# Patient Record
Sex: MALE | Race: WHITE
[De-identification: names, ages, dates, MRNs, and addresses within clinical notes are randomized per-mention and may not be internally consistent; named-entity substitution may affect disease eponyms.]

---

## 2022-11-10 ENCOUNTER — APPOINTMENT (OUTPATIENT)
Dept: INTERNAL MEDICINE | Facility: CLINIC | Age: 22
End: 2022-11-10

## 2022-11-10 VITALS — WEIGHT: 197 LBS | BODY MASS INDEX: 29.95 KG/M2

## 2022-11-10 VITALS
BODY MASS INDEX: 17.73 KG/M2 | HEIGHT: 68 IN | SYSTOLIC BLOOD PRESSURE: 128 MMHG | TEMPERATURE: 98.8 F | OXYGEN SATURATION: 98 % | WEIGHT: 117 LBS | DIASTOLIC BLOOD PRESSURE: 68 MMHG | HEART RATE: 117 BPM

## 2022-11-10 DIAGNOSIS — F17.200 NICOTINE DEPENDENCE, UNSPECIFIED, UNCOMPLICATED: ICD-10-CM

## 2022-11-10 DIAGNOSIS — Z00.00 ENCOUNTER FOR GENERAL ADULT MEDICAL EXAMINATION W/OUT ABNORMAL FINDINGS: ICD-10-CM

## 2022-11-10 DIAGNOSIS — G43.909 MIGRAINE, UNSPECIFIED, NOT INTRACTABLE, W/OUT STATUS MIGRAINOSUS: ICD-10-CM

## 2022-11-10 DIAGNOSIS — Z80.8 FAMILY HISTORY OF MALIGNANT NEOPLASM OF OTHER ORGANS OR SYSTEMS: ICD-10-CM

## 2022-11-10 DIAGNOSIS — Z23 ENCOUNTER FOR IMMUNIZATION: ICD-10-CM

## 2022-11-10 DIAGNOSIS — Z78.9 OTHER SPECIFIED HEALTH STATUS: ICD-10-CM

## 2022-11-10 DIAGNOSIS — U07.1 COVID-19: ICD-10-CM

## 2022-11-10 DIAGNOSIS — F41.9 ANXIETY DISORDER, UNSPECIFIED: ICD-10-CM

## 2022-11-10 PROCEDURE — G0444 DEPRESSION SCREEN ANNUAL: CPT | Mod: 59

## 2022-11-10 PROCEDURE — 99385 PREV VISIT NEW AGE 18-39: CPT | Mod: 25

## 2022-11-10 NOTE — HISTORY OF PRESENT ILLNESS
[FreeTextEntry1] : Pt is here for a physical.\par Here for the first time [de-identified] : Has   self dx migraines with visual aura of flashing light  followed by  severe headache. They were very infreq until recently.    Has been able to take excedrin when they happened. Have been   lasting about one hour.   Ususally the visual sx    would resolve once the headache began.   The pattern  changed over  the past one week.   Had two in one week.  Pt  had a headache that occurred while he was exercising, got visual aura and headache and was lying  down in bed.   He was alone and tried to speak  and felt    his speech was sluured for maybe one minute. His parents      whom he  then spoke to  did not notice anything.     Might have had less water  then usual that day.

## 2022-11-10 NOTE — HEALTH RISK ASSESSMENT
[Current] : Current [No] : In the past 12 months have you used drugs other than those required for medical reasons? No [0] : 2) Feeling down, depressed, or hopeless: Not at all (0) [Yes] : Yes [1 or 2 (0 pts)] : 1 or 2 (0 points) [Less than monthly (1 pt)] : Less than monthly (1 point) [No falls in past year] : Patient reported no falls in the past year [PHQ-2 Negative - No further assessment needed] : PHQ-2 Negative - No further assessment needed [None] : None [With Family] : lives with family [Single] : single [Fully functional (bathing, dressing, toileting, transferring, walking, feeding)] : Fully functional (bathing, dressing, toileting, transferring, walking, feeding) [Fully functional (using the telephone, shopping, preparing meals, housekeeping, doing laundry, using] : Fully functional and needs no help or supervision to perform IADLs (using the telephone, shopping, preparing meals, housekeeping, doing laundry, using transportation, managing medications and managing finances) [Smoke Detector] : smoke detector [Carbon Monoxide Detector] : carbon monoxide detector [Seat Belt] :  uses seat belt [With Patient/Caregiver] : , with patient/caregiver [de-identified] : walking  [de-identified] : reg [MPI3Egqal] : 0 [EyeExamDate] : 2021 [Change in mental status noted] : No change in mental status noted [AdvancecareDate] : 11/10/22

## 2022-11-19 ENCOUNTER — APPOINTMENT (OUTPATIENT)
Dept: MRI IMAGING | Facility: CLINIC | Age: 22
End: 2022-11-19

## 2023-02-10 ENCOUNTER — NON-APPOINTMENT (OUTPATIENT)
Age: 23
End: 2023-02-10

## 2023-02-10 ENCOUNTER — APPOINTMENT (OUTPATIENT)
Dept: NEUROLOGY | Facility: CLINIC | Age: 23
End: 2023-02-10
Payer: COMMERCIAL

## 2023-02-10 VITALS
WEIGHT: 195 LBS | BODY MASS INDEX: 29.55 KG/M2 | DIASTOLIC BLOOD PRESSURE: 77 MMHG | SYSTOLIC BLOOD PRESSURE: 114 MMHG | HEART RATE: 103 BPM | TEMPERATURE: 97.8 F | HEIGHT: 68 IN

## 2023-02-10 DIAGNOSIS — G43.109 MIGRAINE WITH AURA, NOT INTRACTABLE, W/OUT STATUS MIGRAINOSUS: ICD-10-CM

## 2023-02-10 DIAGNOSIS — Z78.9 OTHER SPECIFIED HEALTH STATUS: ICD-10-CM

## 2023-02-10 PROCEDURE — 99204 OFFICE O/P NEW MOD 45 MIN: CPT

## 2023-02-10 NOTE — HISTORY OF PRESENT ILLNESS
[FreeTextEntry1] : Mr. Crain presents today for neurology evaluation.\par In November 2021 he began to have headaches that were occurring once every few months.\par \par He says that his first symptom is a visual disturbance as if he stared at the light too long. This typically starts in his right eye and moves across his visual field to the left eye. This lasts ~ 15-20 minutes and is followed by a headache and dizziness.\par The pain is usually holocephalic and described as pressure. \par He is not sure if he has photophobia or phonophobia but proactively tries to go to a quiet, dark room.\par He does not have nausea/vomiting.\par His last episode was in November 2022. He felt foggy and could not speak well and then became very anxious.\par \par Excedrin is helpful to abort the headache.\par He thinks that dehydration may be a trigger.\par He is not aware of any food triggers.\par He is not aware of any family history of migraines. \par \par He sleeps for 6-8 hours per night. \par \par He thinks that stress may play a role.\par

## 2023-02-10 NOTE — CONSULT LETTER
[Dear  ___] : Dear  [unfilled], [Consult Letter:] : I had the pleasure of evaluating your patient, [unfilled]. [Consult Closing:] : Thank you very much for allowing me to participate in the care of this patient.  If you have any questions, please do not hesitate to contact me. [FreeTextEntry2] : Edwige Pandey [FreeTextEntry3] : Sincerely,\par \par \par Anita Palmer MD\par Diplomate, [employer] Neu[employer]\par Board Certified in the Subspecialty of Clinical Neurophysiology\par Board Certified in the Subspecialty of Sleep Medicine\par Board Certified in the Subspecialty of Epilepsy\par

## 2023-02-10 NOTE — DISCUSSION/SUMMARY
[FreeTextEntry1] : Mr. Crain is 22 year old man with headaches preceded by visual disturbance.\par His presentation is consistent with migraine with aura.\par \par He has a non-focal neurological examination.\par \par \par We discussed preventative versus abortive treatment for migraines and at this time I do not think that her headache frequency justifies preventative therapy.\par \par We discussed options to abort migraines including triptans.\par He is declining a prescription for triptan at this time since he finds that he has benefit with Excedrin.\par I am giving him a sample of Nurtec 75 mg to be used in case of a migraine refractory to Excedrin.\par \par We discussed common migraine triggers. He should keep a headache diary in order to recognize any patterns/triggers.\par \par An MRI of the brain had been ordered by Dr. Pandey.  He was unable to complete the study due to claustrophobia.  I suggested a repeat MRI with medication such as diazepam.  He does not want to take this medication.  He is willing to try an open MRI.\par \par Follow-up in 6 to 9 months.  He was advised to call sooner if he has any worsening of his migraines or changes his mind about prescription medications.\par

## 2023-02-10 NOTE — PHYSICAL EXAM
[FreeTextEntry1] : Examination:\par Constitutional: normal, no apparent distress\par Eyes: normal conjunctiva b/l, no ptosis, visual fields full\par Respiratory: no respiratory distress, normal effort, normal auscultation\par Cardiovascular: normal rate, rhythm, no murmurs\par Neck: supple, no masses\par Vascular: carotids normal\par Skin: normal color, no rashes\par Psych: normal mood, affect\par \par Neurological:\par Memory: normal memory, oriented to person, place, time\par Language intact/no aphasia\par Cranial Nerves: II-XII intact, Pupils equally round and reactive to light, ocular muscles/movements intact, no ptosis, no facial weakness, tongue protrudes normally in the midline, \par Motor: normal tone, no pronator drift, full strength in all four extremities in the proximal and distal muscle groups\par Coordination: Fine motor movements intact, rapid alternating movements intact, finger to nose intact bilaterally\par Sensory: intact to light touch, vibration, joint position sense, negative Romberg examination\par DTRs: symmetric, 1+ in b/l triceps, 1+ in b/l biceps, 1+ in b/l brachioradialis, 2+ in bilateral patellars, 2+ in bilateral Achilles, Babinskis negative bilaterally\par Gait: narrow based, steady, able to walk on heels, toes, tandem gait\par \par

## 2023-02-15 ENCOUNTER — NON-APPOINTMENT (OUTPATIENT)
Age: 23
End: 2023-02-15

## 2023-11-16 ENCOUNTER — APPOINTMENT (OUTPATIENT)
Dept: NEUROLOGY | Facility: CLINIC | Age: 23
End: 2023-11-16